# Patient Record
Sex: FEMALE | NOT HISPANIC OR LATINO | Employment: UNEMPLOYED | URBAN - METROPOLITAN AREA
[De-identification: names, ages, dates, MRNs, and addresses within clinical notes are randomized per-mention and may not be internally consistent; named-entity substitution may affect disease eponyms.]

---

## 2017-04-14 RX ORDER — LEVOTHYROXINE SODIUM 0.03 MG/1
TABLET ORAL
Qty: 30 TAB | Refills: 5 | Status: SHIPPED | OUTPATIENT
Start: 2017-04-14 | End: 2017-04-26 | Stop reason: SDUPTHER

## 2017-04-26 RX ORDER — LEVOTHYROXINE SODIUM 0.03 MG/1
25 TABLET ORAL
Qty: 90 TAB | Refills: 1 | Status: SHIPPED | OUTPATIENT
Start: 2017-04-26 | End: 2017-10-13

## 2017-07-06 ENCOUNTER — HOSPITAL ENCOUNTER (OUTPATIENT)
Dept: RADIOLOGY | Facility: MEDICAL CENTER | Age: 47
End: 2017-07-06
Attending: SURGERY
Payer: COMMERCIAL

## 2017-07-06 DIAGNOSIS — E06.3 CHRONIC LYMPHOCYTIC THYROIDITIS: ICD-10-CM

## 2017-07-06 PROCEDURE — 76536 US EXAM OF HEAD AND NECK: CPT

## 2017-08-10 ENCOUNTER — TELEPHONE (OUTPATIENT)
Dept: MEDICAL GROUP | Facility: MEDICAL CENTER | Age: 47
End: 2017-08-10

## 2017-08-10 DIAGNOSIS — R79.89 ELEVATED TSH: ICD-10-CM

## 2017-08-10 DIAGNOSIS — E04.1 THYROID NODULE: ICD-10-CM

## 2017-08-10 NOTE — TELEPHONE ENCOUNTER
Pt called states she needs a referral to [facility] for her thyroid   She had a referral and already has apt but they told her it was .

## 2017-08-14 ENCOUNTER — OFFICE VISIT (OUTPATIENT)
Dept: MEDICAL GROUP | Facility: MEDICAL CENTER | Age: 47
End: 2017-08-14
Payer: COMMERCIAL

## 2017-08-14 VITALS
HEIGHT: 62 IN | RESPIRATION RATE: 16 BRPM | DIASTOLIC BLOOD PRESSURE: 62 MMHG | SYSTOLIC BLOOD PRESSURE: 106 MMHG | BODY MASS INDEX: 32.57 KG/M2 | OXYGEN SATURATION: 98 % | HEART RATE: 70 BPM | TEMPERATURE: 98 F | WEIGHT: 177 LBS

## 2017-08-14 DIAGNOSIS — H10.13 ALLERGIC CONJUNCTIVITIS OF BOTH EYES: ICD-10-CM

## 2017-08-14 DIAGNOSIS — E03.9 HYPOTHYROIDISM (ACQUIRED): ICD-10-CM

## 2017-08-14 DIAGNOSIS — E66.9 OBESITY (BMI 30-39.9): ICD-10-CM

## 2017-08-14 DIAGNOSIS — Z12.31 ENCOUNTER FOR SCREENING MAMMOGRAM FOR BREAST CANCER: ICD-10-CM

## 2017-08-14 PROCEDURE — 99214 OFFICE O/P EST MOD 30 MIN: CPT | Performed by: NURSE PRACTITIONER

## 2017-08-14 RX ORDER — AZELASTINE HYDROCHLORIDE 0.5 MG/ML
1 SOLUTION/ DROPS OPHTHALMIC 2 TIMES DAILY
Qty: 6 ML | Refills: 2 | Status: SHIPPED | OUTPATIENT
Start: 2017-08-14 | End: 2019-05-06

## 2017-08-14 ASSESSMENT — PATIENT HEALTH QUESTIONNAIRE - PHQ9: CLINICAL INTERPRETATION OF PHQ2 SCORE: 0

## 2017-08-14 NOTE — MR AVS SNAPSHOT
"        Barb Clemensrez   2017 9:40 AM   Office Visit   MRN: [id number]    Department:  [facility]   Dept Phone:  [phone]    Description:  Female : 1970   Provider:  AILYN Bradshaw           Reason for Visit     Follow-Up           Allergies as of 2017     Allergen Noted Reactions    Pcn [Penicillins] 2016   Shortness of Breath, Rash    .      You were diagnosed with     Hypothyroidism (acquired)   [705452]       Allergic conjunctivitis of both eyes   [495248]       Encounter for screening mammogram for breast cancer   [0193376]       Obesity (BMI 30-39.9)   [295796]         Vital Signs     Blood Pressure Pulse Temperature Respirations Height Weight    106/62 mmHg 70 36.7 °C (98 °F) 16 1.575 m (5' 2.01\") 80.287 kg (177 lb)    Body Mass Index Oxygen Saturation Smoking Status             32.37 kg/m2 98% Never Smoker          Basic Information     Date Of Birth Sex Race Ethnicity Preferred Language    1970 Female  or  Non- English      Problem List              ICD-10-CM Priority Class Noted - Resolved    Healthy adult [id number]   2016 - Present    Thyroid nodule E04.1   2016 - Present    Carpal tunnel syndrome G56.00   10/4/2016 - Present    Allergic conjunctivitis of both eyes H10.13   2017 - Present    Hypothyroidism (acquired) E03.9   2017 - Present    Obesity (BMI 30-39.9) E66.9   2017 - Present      Health Maintenance        Date Due Completion Dates    IMM DTaP/Tdap/Td Vaccine (1 - Tdap) 1991 ---    MAMMOGRAM 2017    IMM INFLUENZA (1) 2017 ---    PAP SMEAR 2019, 2016            Current Immunizations     No immunizations on file.      Below and/or attached are the medications your provider expects you to take. Review all of your home medications and newly ordered medications with your provider and/or pharmacist. Follow medication instructions as directed by your provider and/or " pharmacist. Please keep your medication list with you and share with your provider. Update the information when medications are discontinued, doses are changed, or new medications (including over-the-counter products) are added; and carry medication information at all times in the event of emergency situations     Allergies:  PCN - Shortness of Breath,Rash               Medications  Valid as of: August 14, 2017 - 11:29 AM    Generic Name Brand Name Tablet Size Instructions for use    Azelastine HCl (Solution) OPTIVAR 0.05 % Place 1 Drop in both eyes 2 times a day.        Levothyroxine Sodium (Tab) SYNTHROID 25 MCG Take 1 Tab by mouth every morning before breakfast.        .                 Medicines prescribed today were sent to:     [id number] - [place], [place] - [address]    [address] [address]    Phone: [phone] Fax: [phone]    Open 24 Hours?: No      Medication refill instructions:       If your prescription bottle indicates you have medication refills left, it is not necessary to call your provider’s office. Please contact your pharmacy and they will refill your medication.    If your prescription bottle indicates you do not have any refills left, you may request refills at any time through one of the following ways: The online [employer] system (except Urgent Care), by calling your provider’s office, or by asking your pharmacy to contact your provider’s office with a refill request. Medication refills are processed only during regular business hours and may not be available until the next business day. Your provider may request additional information or to have a follow-up visit with you prior to refilling your medication.   *Please Note: Medication refills are assigned a new Rx number when refilled electronically. Your pharmacy may indicate that no refills were authorized even though a new prescription for the same medication is available at the pharmacy. Please  request the medicine by name with the pharmacy before contacting your provider for a refill.        Your To Do List     Future Labs/Procedures Complete By Expires    COMP METABOLIC PANEL  As directed 8/15/2018    FREE THYROXINE  As directed 8/15/2018    MA-SCREEN MAMMO W/CAD-BILAT  As directed 9/15/2018    TSH  As directed 8/15/2018         MyChart Status: Patient Declined

## 2017-08-14 NOTE — ASSESSMENT & PLAN NOTE
On levothyroxine 25 µg daily  Still having difficulty with low energy level and weight gain  Due for recheck of labs  Denies constipation, hair loss, heat or cold intolerance

## 2017-08-14 NOTE — PROGRESS NOTES
"Subjective:     Chief Complaint   Patient presents with   • Follow-Up     Barb Carlisle is a 47 y.o. female here today to follow up on:    Allergic conjunctivitis of both eyes  Difficulty with itchy watery eyes over the past 2 months, sometimes with redness or irritation and excessive tearing. She's tried multiple over-the-counter allergy drops without relief and is requesting prescription. No purulent discharge, no pain    Hypothyroidism (acquired)  On levothyroxine 25 µg daily  Still having difficulty with low energy level and weight gain  Due for recheck of labs  Denies constipation, hair loss, heat or cold intolerance    Obesity (BMI 30-39.9)  Gradual weight gain since last visit, not having any particular exercise. She is trying to follow a healthy diet     Due for mammogram  Current medicines (including changes today)  Current Outpatient Prescriptions   Medication Sig Dispense Refill   • azelastine (OPTIVAR) 0.05 % ophthalmic solution Place 1 Drop in both eyes 2 times a day. 6 mL 2   • levothyroxine (SYNTHROID) 25 MCG Tab Take 1 Tab by mouth every morning before breakfast. 90 Tab 1     No current facility-administered medications for this visit.     She  has a past medical history of Disorder of thyroid.    ROS included above     Objective:     Blood pressure 106/62, pulse 70, temperature 36.7 °C (98 °F), resp. rate 16, height 1.575 m (5' 2.01\"), weight 80.287 kg (177 lb), SpO2 98 %. Body mass index is 32.37 kg/(m^2).     Physical Exam:  General: Alert, oriented in no acute distress.  Eye contact is good, speech is normal, affect calm  HEENT: Conjunctiva and sclera clear, no drainage Oral mucosa pink moist, no lesions. TMs gray with good landmarks bilaterally. No lymphadenopathy.  Lungs: clear to auscultation bilaterally, normal effort, no wheeze/ rhonchi/ rales.  CV: regular rate and rhythm, S1, S2  Abdomen: soft, nontender  Ext: no edema, color normal, vascularity normal, temperature normal    Assessment " and Plan:   The following treatment plan was discussed  1. Hypothyroidism (acquired)   poor energy level and weight gain over the last year. Recheck labs, will adjust levothyroxine as needed  TSH    FREE THYROXINE    COMP METABOLIC PANEL   2. Allergic conjunctivitis of both eyes   she's tried multiple over-the-counter allergy drops without relief. Azelastine sent, follow-up if not improving    3. Encounter for screening mammogram for breast cancer  MA-SCREEN MAMMO W/CAD-BILAT   4. Obesity (BMI 30-39.9)  Patient identified as having weight management issue.  Appropriate orders and counseling given.     Followup: Pending labs         Please note that this dictation was created using voice recognition software. I have worked with consultants from the vendor as well as technical experts from [employer] to optimize the interface. I have made every reasonable attempt to correct obvious errors, but I expect that there are errors of grammar and possibly content that I did not discover before finalizing the note.

## 2017-08-14 NOTE — ASSESSMENT & PLAN NOTE
Gradual weight gain since last visit, not having any particular exercise. She is trying to follow a healthy diet

## 2017-08-14 NOTE — ASSESSMENT & PLAN NOTE
Difficulty with itchy watery eyes over the past 2 months, sometimes with redness or irritation and excessive tearing. She's tried multiple over-the-counter allergy drops without relief and is requesting prescription. No purulent discharge, no pain

## 2017-08-23 ENCOUNTER — HOSPITAL ENCOUNTER (OUTPATIENT)
Dept: RADIOLOGY | Facility: MEDICAL CENTER | Age: 47
End: 2017-08-23

## 2017-08-26 ENCOUNTER — HOSPITAL ENCOUNTER (OUTPATIENT)
Dept: RADIOLOGY | Facility: MEDICAL CENTER | Age: 47
End: 2017-08-26
Attending: NURSE PRACTITIONER
Payer: COMMERCIAL

## 2017-08-26 DIAGNOSIS — Z12.31 ENCOUNTER FOR SCREENING MAMMOGRAM FOR BREAST CANCER: ICD-10-CM

## 2017-08-26 PROCEDURE — G0202 SCR MAMMO BI INCL CAD: HCPCS

## 2017-08-28 ENCOUNTER — TELEPHONE (OUTPATIENT)
Dept: MEDICAL GROUP | Facility: MEDICAL CENTER | Age: 47
End: 2017-08-28

## 2017-08-28 NOTE — TELEPHONE ENCOUNTER
1. Caller Name:Barb Carlisle                          Call Back Number: .    2. Message: Patient called states at her last ov visit she stated she has back pain and you were going to give her back exercises, but states she never got them. Would like to know if we can mail them to her,    3. Patient approves office to leave a detailed voicemail/MyChart message: yes

## 2017-08-29 ENCOUNTER — HOSPITAL ENCOUNTER (OUTPATIENT)
Dept: LAB | Facility: MEDICAL CENTER | Age: 47
End: 2017-08-29
Attending: NURSE PRACTITIONER
Payer: COMMERCIAL

## 2017-08-29 DIAGNOSIS — E03.9 HYPOTHYROIDISM (ACQUIRED): ICD-10-CM

## 2017-08-29 LAB
ALBUMIN SERPL BCP-MCNC: 4.1 G/DL (ref 3.2–4.9)
ALBUMIN/GLOB SERPL: 1.4 G/DL
ALP SERPL-CCNC: 51 U/L (ref 30–99)
ALT SERPL-CCNC: 21 U/L (ref 2–50)
ANION GAP SERPL CALC-SCNC: 7 MMOL/L (ref 0–11.9)
AST SERPL-CCNC: 18 U/L (ref 12–45)
BILIRUB SERPL-MCNC: 0.3 MG/DL (ref 0.1–1.5)
BUN SERPL-MCNC: 10 MG/DL (ref 8–22)
CALCIUM SERPL-MCNC: 9.6 MG/DL (ref 8.5–10.5)
CHLORIDE SERPL-SCNC: 106 MMOL/L (ref 96–112)
CO2 SERPL-SCNC: 23 MMOL/L (ref 20–33)
CREAT SERPL-MCNC: 0.73 MG/DL (ref 0.5–1.4)
FASTING STATUS PATIENT QL REPORTED: NORMAL
GFR SERPL CREATININE-BSD FRML MDRD: >60 ML/MIN/1.73 M 2
GLOBULIN SER CALC-MCNC: 2.9 G/DL (ref 1.9–3.5)
GLUCOSE SERPL-MCNC: 90 MG/DL (ref 65–99)
POTASSIUM SERPL-SCNC: 3.8 MMOL/L (ref 3.6–5.5)
PROT SERPL-MCNC: 7 G/DL (ref 6–8.2)
SODIUM SERPL-SCNC: 136 MMOL/L (ref 135–145)
T4 FREE SERPL-MCNC: 0.76 NG/DL (ref 0.53–1.43)
TSH SERPL DL<=0.005 MIU/L-ACNC: 5.81 UIU/ML (ref 0.3–3.7)

## 2017-08-29 PROCEDURE — 36415 COLL VENOUS BLD VENIPUNCTURE: CPT

## 2017-08-29 PROCEDURE — 80053 COMPREHEN METABOLIC PANEL: CPT

## 2017-08-29 PROCEDURE — 84439 ASSAY OF FREE THYROXINE: CPT

## 2017-08-29 PROCEDURE — 84443 ASSAY THYROID STIM HORMONE: CPT

## 2017-08-30 ENCOUNTER — TELEPHONE (OUTPATIENT)
Dept: MEDICAL GROUP | Facility: MEDICAL CENTER | Age: 47
End: 2017-08-30

## 2017-08-30 DIAGNOSIS — E03.9 HYPOTHYROIDISM (ACQUIRED): ICD-10-CM

## 2017-08-30 RX ORDER — LEVOTHYROXINE SODIUM 0.05 MG/1
50 TABLET ORAL
Qty: 30 TAB | Refills: 5 | Status: SHIPPED | OUTPATIENT
Start: 2017-08-30 | End: 2018-03-16 | Stop reason: SDUPTHER

## 2017-08-30 NOTE — TELEPHONE ENCOUNTER
----- Message from AILYN Bradshaw sent at 8/30/2017 10:36 AM PDT -----  Please inform patient:  Kidney function, liver function, electrolytes, glucose are normal. It does likely need to adjust her thyroid dose a little bit, I will send a higher dose to pharmacy. She should recheck labs in November, please mail paperwork

## 2017-10-13 ENCOUNTER — OFFICE VISIT (OUTPATIENT)
Dept: ENDOCRINOLOGY | Facility: MEDICAL CENTER | Age: 47
End: 2017-10-13
Payer: COMMERCIAL

## 2017-10-13 VITALS
WEIGHT: 176.8 LBS | OXYGEN SATURATION: 100 % | HEART RATE: 71 BPM | HEIGHT: 63 IN | DIASTOLIC BLOOD PRESSURE: 72 MMHG | SYSTOLIC BLOOD PRESSURE: 112 MMHG | BODY MASS INDEX: 31.33 KG/M2

## 2017-10-13 DIAGNOSIS — E03.9 ACQUIRED HYPOTHYROIDISM: ICD-10-CM

## 2017-10-13 DIAGNOSIS — E04.2 MULTIPLE THYROID NODULES: ICD-10-CM

## 2017-10-13 PROCEDURE — 99204 OFFICE O/P NEW MOD 45 MIN: CPT | Performed by: INTERNAL MEDICINE

## 2017-10-13 NOTE — PROGRESS NOTES
New Patient Consult Note  Primary care physician: AILYN Bradshaw    Reason for consult: Multiple thyroid nodules    HPI:  Barb Carlisle is a 47 y.o. old patient who comes in today for evaluation of thyroid nodules. Thyroid ultrasound in July 2017 showed stable appearance of dominant of thyroid nodule, and slightly decreased size of the smaller left thyroid lobe nodule, compared to prior ultrasound in 2016. No family history of thyroid cancer. FNA of dominant left thyroid lobe nodule in December 2016 was benign. She has hypothyroidism as well, she is currently on levothyroxine 50 µg daily. Dose was increased about 4 weeks ago as TSH was slightly elevated. She reports compliance with medications. Energy levels are good.    ROS:  Constitutional: No unintentional weight loss  Cardiac: No palpitations or racing heart  Resp: No shortness of breath    All other systems were reviewed and were negative.    Past Medical History:  Patient Active Problem List    Diagnosis Date Noted   • Allergic conjunctivitis of both eyes 08/14/2017   • Hypothyroidism (acquired) 08/14/2017   • Obesity (BMI 30-39.9) 08/14/2017   • Carpal tunnel syndrome 10/04/2016   • Thyroid nodule 09/21/2016   • Healthy adult 07/02/2016       Past Surgical History:  Past Surgical History:   Procedure Laterality Date   • CARPAL TUNNEL ENDOSCOPIC Right 10/4/2016    Procedure: CARPAL TUNNEL ENDOSCOPIC RELEASE;  Surgeon: Raheem Gross M.D.;  Location: SURGERY [facility];  Service:    • GM BY LAPAROSCOPY     • KNEE ARTHROSCOPY Right    • PB APPENDECTOMY     • SHOULDER ARTHROSCOPY         Allergies:  Pcn [penicillins]    Social History:  Social History     Social History   • Marital status:      Spouse name: N/A   • Number of children: N/A   • Years of education: N/A     Occupational History   • Not on file.     Social History Main Topics   • Smoking status: Never Smoker   • Smokeless tobacco: Never Used   • Alcohol use No   • Drug  "use: No   • Sexual activity: Not on file     Other Topics Concern   • Not on file     Social History Narrative   • No narrative on file       Family History:  Family History   Problem Relation Age of Onset   • Diabetes Mother    • Diabetes Father    • Diabetes Brother    • Stroke Brother    • Cancer Maternal Aunt      breast   • Cancer Maternal Grandmother      throat   • Cancer Maternal Grandfather      prostate       Medications:    Current Outpatient Prescriptions:   •  Cholecalciferol (VITAMIN D3 PO), Take 4,000 Units by mouth., Disp: , Rfl:   •  levothyroxine (SYNTHROID) 50 MCG Tab, Take 1 Tab by mouth Every morning on an empty stomach., Disp: 30 Tab, Rfl: 5  •  azelastine (OPTIVAR) 0.05 % ophthalmic solution, Place 1 Drop in both eyes 2 times a day., Disp: 6 mL, Rfl: 2    Labs:  Results for JAZZMINE DAVENPORT (MRN [id number]) as of 10/13/2017 09:44   Ref. Range 8/29/2017 08:29   TSH Latest Ref Range: 0.300 - 3.700 uIU/mL 5.810 (H)   Free T-4 Latest Ref Range: 0.53 - 1.43 ng/dL 0.76     Physical Examination:  Vital signs: /72   Pulse 71   Ht 1.6 m (5' 3\")   Wt 80.2 kg (176 lb 12.8 oz)   LMP 10/13/2017   SpO2 100%   BMI 31.32 kg/m²   General: No apparent distress, cooperative  Eyes: No scleral icterus or discharge  ENMT: Normal on external inspection of nose, lips  Neck: No abnormal masses on inspection  Resp: Normal effort, clear to auscultation bilaterally   CVS: Regular rate and rhythm, S1 S2 normal, no murmur   Extremities: No edema  Neuro: Alert and oriented  Skin: No rash  Psych: Normal mood and affect    Assessment and Plan:    1. Multiple thyroid nodules  · We will repeat thyroid ultrasound in July 2018  - US-SOFT TISSUES OF HEAD - NECK; Future    2. Acquired hypothyroidism  · Continue levothyroxine 50 µg daily  · Repeat labs for TSH and free T4 are in a month and then again in 6 months  - TSH; Future  - FREE THYROXINE; Future    Return around 7/20/2018.    Thank you for allowing me to participate " in the care of this patient.    Ninoska Rooney M.D.  10/13/17    CC:   YUE Bradshaw.    This note was created using voice recognition software ([employer]agon). The accuracy of the dictation is limited by the abilities of the software. I have reviewed the note prior to signing, however some errors in grammar and context are still possible. If you have any questions related to this note please do not hesitate to contact our office.

## 2018-03-18 DIAGNOSIS — E03.9 HYPOTHYROIDISM (ACQUIRED): ICD-10-CM

## 2018-03-19 RX ORDER — LEVOTHYROXINE SODIUM 0.05 MG/1
50 TABLET ORAL
Qty: 30 TAB | Refills: 5 | Status: SHIPPED | OUTPATIENT
Start: 2018-03-19 | End: 2019-03-25 | Stop reason: SDUPTHER

## 2018-03-19 NOTE — TELEPHONE ENCOUNTER
Phone Number Called: [phone] (home)     Message: Left message for pt to call back at [phone].     Left Message for patient to call back: yes

## 2018-04-20 ENCOUNTER — TELEPHONE (OUTPATIENT)
Dept: MEDICAL GROUP | Facility: MEDICAL CENTER | Age: 48
End: 2018-04-20

## 2018-04-20 NOTE — TELEPHONE ENCOUNTER
1. Caller Name: Barb Carlisle                                         Call Back Number: [phone] (home)       Patient approves a detailed voicemail message: yes    Pt states Dr. Rooney referred for US in July. Pt would like to know if you feel this should be done sooner.

## 2018-05-05 ENCOUNTER — HOSPITAL ENCOUNTER (OUTPATIENT)
Dept: LAB | Facility: MEDICAL CENTER | Age: 48
End: 2018-05-05
Attending: STUDENT IN AN ORGANIZED HEALTH CARE EDUCATION/TRAINING PROGRAM
Payer: COMMERCIAL

## 2018-05-05 LAB
ALBUMIN SERPL BCP-MCNC: 4.1 G/DL (ref 3.2–4.9)
ALBUMIN/GLOB SERPL: 1.3 G/DL
ALP SERPL-CCNC: 49 U/L (ref 30–99)
ALT SERPL-CCNC: 24 U/L (ref 2–50)
ANION GAP SERPL CALC-SCNC: 7 MMOL/L (ref 0–11.9)
AST SERPL-CCNC: 18 U/L (ref 12–45)
BASOPHILS # BLD AUTO: 0.8 % (ref 0–1.8)
BASOPHILS # BLD: 0.06 K/UL (ref 0–0.12)
BILIRUB SERPL-MCNC: 0.6 MG/DL (ref 0.1–1.5)
BUN SERPL-MCNC: 20 MG/DL (ref 8–22)
CALCIUM SERPL-MCNC: 9.1 MG/DL (ref 8.5–10.5)
CHLORIDE SERPL-SCNC: 106 MMOL/L (ref 96–112)
CHOLEST SERPL-MCNC: 166 MG/DL (ref 100–199)
CO2 SERPL-SCNC: 23 MMOL/L (ref 20–33)
CREAT SERPL-MCNC: 0.74 MG/DL (ref 0.5–1.4)
EOSINOPHIL # BLD AUTO: 0.19 K/UL (ref 0–0.51)
EOSINOPHIL NFR BLD: 2.4 % (ref 0–6.9)
ERYTHROCYTE [DISTWIDTH] IN BLOOD BY AUTOMATED COUNT: 40.7 FL (ref 35.9–50)
GLOBULIN SER CALC-MCNC: 3.2 G/DL (ref 1.9–3.5)
GLUCOSE SERPL-MCNC: 95 MG/DL (ref 65–99)
HCT VFR BLD AUTO: 43.4 % (ref 37–47)
HDLC SERPL-MCNC: 49 MG/DL
HGB BLD-MCNC: 14.4 G/DL (ref 12–16)
IMM GRANULOCYTES # BLD AUTO: 0.03 K/UL (ref 0–0.11)
IMM GRANULOCYTES NFR BLD AUTO: 0.4 % (ref 0–0.9)
LDLC SERPL CALC-MCNC: 87 MG/DL
LYMPHOCYTES # BLD AUTO: 2.08 K/UL (ref 1–4.8)
LYMPHOCYTES NFR BLD: 26.5 % (ref 22–41)
MCH RBC QN AUTO: 29.5 PG (ref 27–33)
MCHC RBC AUTO-ENTMCNC: 33.2 G/DL (ref 33.6–35)
MCV RBC AUTO: 88.9 FL (ref 81.4–97.8)
MONOCYTES # BLD AUTO: 0.55 K/UL (ref 0–0.85)
MONOCYTES NFR BLD AUTO: 7 % (ref 0–13.4)
NEUTROPHILS # BLD AUTO: 4.94 K/UL (ref 2–7.15)
NEUTROPHILS NFR BLD: 62.9 % (ref 44–72)
NRBC # BLD AUTO: 0 K/UL
NRBC BLD-RTO: 0 /100 WBC
PLATELET # BLD AUTO: 270 K/UL (ref 164–446)
PMV BLD AUTO: 11.1 FL (ref 9–12.9)
POTASSIUM SERPL-SCNC: 3.8 MMOL/L (ref 3.6–5.5)
PROT SERPL-MCNC: 7.3 G/DL (ref 6–8.2)
RBC # BLD AUTO: 4.88 M/UL (ref 4.2–5.4)
SODIUM SERPL-SCNC: 136 MMOL/L (ref 135–145)
T3FREE SERPL-MCNC: 3.26 PG/ML (ref 2.4–4.2)
T4 FREE SERPL-MCNC: 0.75 NG/DL (ref 0.53–1.43)
TRIGL SERPL-MCNC: 152 MG/DL (ref 0–149)
TSH SERPL DL<=0.005 MIU/L-ACNC: 3.92 UIU/ML (ref 0.38–5.33)
WBC # BLD AUTO: 7.9 K/UL (ref 4.8–10.8)

## 2018-05-05 PROCEDURE — 83036 HEMOGLOBIN GLYCOSYLATED A1C: CPT

## 2018-05-05 PROCEDURE — 84481 FREE ASSAY (FT-3): CPT

## 2018-05-05 PROCEDURE — 36415 COLL VENOUS BLD VENIPUNCTURE: CPT

## 2018-05-05 PROCEDURE — 80053 COMPREHEN METABOLIC PANEL: CPT

## 2018-05-05 PROCEDURE — 84439 ASSAY OF FREE THYROXINE: CPT

## 2018-05-05 PROCEDURE — 84443 ASSAY THYROID STIM HORMONE: CPT

## 2018-05-05 PROCEDURE — 80061 LIPID PANEL: CPT

## 2018-05-05 PROCEDURE — 85025 COMPLETE CBC W/AUTO DIFF WBC: CPT

## 2018-05-05 PROCEDURE — 82652 VIT D 1 25-DIHYDROXY: CPT

## 2018-05-07 LAB — 1,25(OH)2D3 SERPL-MCNC: 43.9 PG/ML (ref 19.9–79.3)

## 2018-05-09 LAB
EST. AVERAGE GLUCOSE BLD GHB EST-MCNC: 117 MG/DL
HBA1C MFR BLD: 5.7 % (ref 0–5.6)

## 2018-07-27 ENCOUNTER — OFFICE VISIT (OUTPATIENT)
Dept: ENDOCRINOLOGY | Facility: MEDICAL CENTER | Age: 48
End: 2018-07-27
Payer: COMMERCIAL

## 2018-07-27 VITALS
BODY MASS INDEX: 30.83 KG/M2 | SYSTOLIC BLOOD PRESSURE: 120 MMHG | DIASTOLIC BLOOD PRESSURE: 69 MMHG | OXYGEN SATURATION: 96 % | HEART RATE: 81 BPM | HEIGHT: 63 IN | WEIGHT: 174 LBS

## 2018-07-27 DIAGNOSIS — E03.9 ACQUIRED HYPOTHYROIDISM: ICD-10-CM

## 2018-07-27 DIAGNOSIS — Z91.89 AT RISK FOR DIABETES MELLITUS: ICD-10-CM

## 2018-07-27 DIAGNOSIS — E04.2 MULTIPLE THYROID NODULES: ICD-10-CM

## 2018-07-27 PROCEDURE — 99214 OFFICE O/P EST MOD 30 MIN: CPT | Performed by: INTERNAL MEDICINE

## 2018-07-28 ENCOUNTER — HOSPITAL ENCOUNTER (OUTPATIENT)
Dept: RADIOLOGY | Facility: MEDICAL CENTER | Age: 48
End: 2018-07-28
Attending: INTERNAL MEDICINE
Payer: COMMERCIAL

## 2018-07-28 DIAGNOSIS — E04.2 MULTIPLE THYROID NODULES: ICD-10-CM

## 2018-07-28 PROCEDURE — 76536 US EXAM OF HEAD AND NECK: CPT

## 2018-08-01 ENCOUNTER — TELEPHONE (OUTPATIENT)
Dept: ENDOCRINOLOGY | Facility: MEDICAL CENTER | Age: 48
End: 2018-08-01

## 2018-08-01 NOTE — TELEPHONE ENCOUNTER
----- Message from Ninoska Rooney M.D. sent at 7/31/2018  8:22 AM PDT -----  Please inform patient that thyroid nodules are essentially stable in size.

## 2019-02-06 ENCOUNTER — TELEPHONE (OUTPATIENT)
Dept: ENDOCRINOLOGY | Facility: MEDICAL CENTER | Age: 49
End: 2019-02-06

## 2019-02-06 ENCOUNTER — OFFICE VISIT (OUTPATIENT)
Dept: ENDOCRINOLOGY | Facility: MEDICAL CENTER | Age: 49
End: 2019-02-06

## 2019-02-06 VITALS
HEART RATE: 94 BPM | OXYGEN SATURATION: 96 % | SYSTOLIC BLOOD PRESSURE: 102 MMHG | HEIGHT: 63 IN | WEIGHT: 178 LBS | DIASTOLIC BLOOD PRESSURE: 72 MMHG | BODY MASS INDEX: 31.54 KG/M2

## 2019-02-06 DIAGNOSIS — E66.9 OBESITY (BMI 30-39.9): ICD-10-CM

## 2019-02-06 DIAGNOSIS — R53.83 FATIGUE, UNSPECIFIED TYPE: ICD-10-CM

## 2019-02-06 DIAGNOSIS — R73.01 IMPAIRED FASTING GLUCOSE: ICD-10-CM

## 2019-02-06 DIAGNOSIS — E04.1 THYROID NODULE: ICD-10-CM

## 2019-02-06 DIAGNOSIS — E03.9 HYPOTHYROIDISM (ACQUIRED): ICD-10-CM

## 2019-02-06 DIAGNOSIS — E55.9 VITAMIN D DEFICIENCY: ICD-10-CM

## 2019-02-06 PROCEDURE — 99214 OFFICE O/P EST MOD 30 MIN: CPT | Performed by: PHYSICIAN ASSISTANT

## 2019-02-06 NOTE — TELEPHONE ENCOUNTER
Phone Number Called: [phone] (home)     Message: Let Pt know that an US has been ordered and they should be calling to get her scheduled. Pt understood and will make the appointment to get it done.     Left Message for patient to call back: N\A

## 2019-02-06 NOTE — TELEPHONE ENCOUNTER
----- Message from Dorys Sears P.A.-C. sent at 2/6/2019  9:31 AM PST -----  Please call patient and tell her I added a thyroid ultrasound.  They should be calling her to schedule this in the near future.

## 2019-02-06 NOTE — PROGRESS NOTES
Endocrinology Clinic Progress Note    CC:     HPI:  Barb Carlisle is a 48 y.o. old patient who comes in today with her daughter and grandson for routine follow up.       According to the patient she does have some complaints of fatigue, dry mouth, increased urination and increased thirst.  This is been going on for the last several months.  She attributes the increase in thirst related to a surgery she had with regards to her arm.  She has not had any recent laboratory work.    1. Multiple thyroid nodules   Denies any associated difficulty swallowing or breathing.    Thyroid ultrasound: 7/28/2018  Solid nodule mid lateral left lobe thyroid gland measures 1.14 x 1.14 x 1.24 cm. Prior exam was 1.0 x 1.1 x 1.1 cm. Nodule in lower pole left lobe measures 0.6 x 0.6 x 0.6 cm.      7/6/2017  There is a nodule in the mid left thyroid lobe which measures 1.0 x 1.1 x 1.1 cm. It has not significantly changed. There is a nodule in the lower pole of the left thyroid lobe which measures 0.7 x 0.6 x 0.6 cm. Is slightly smaller than it was on previous exam.    FNA left thyroid: 12/22/2016 follicular epithelial cells consistent with follicular lesion no papillary carcinoma was seen       2. Acquired hypothyroidism  She is currently on levothyroxine 50 mcg daily.  Reports compliance of medications.  Patient occasionally misses doses secondary to pharmacy not filling medications right away.       5/5/2018 TSH 3.920 free T4 0.75 free T3 3.26  8/29/2017 TSH 5.810 free T4 0.76  3. At risk for diabetes mellitus  5/5/2018- A1c is 5.7%.,  Total cholesterol 162, triglycerides 152, HDL 49, LDL 87     Patient denies any diet and/or exercise regimen    4. Vitamin D deficiency -patient is currently on vitamin D replacement  5/5/2018 vitamin D/1, 2543.9    5.  Obesity weight 80.7 kg (178 pounds)    ROS:  Constitutional: No fevers chills lightheadedness and/or dizziness.  She denies any tremors and/or palpitations.  She denies any choking and/or  "difficulty swallowing.    Medications:    Current Outpatient Prescriptions:   •  levothyroxine, 50 mcg, Oral, AM ES, Taking  •  Cholecalciferol (VITAMIN D3 PO), Take 4,000 Units by mouth., Taking  •  levothyroxine, 50 mcg, Oral, AM ES  •  azelastine, 1 Drop, Both Eyes, BID    EXAM:  Vital signs: /72 (BP Location: Right arm, Patient Position: Sitting)   Pulse 94   Ht 1.6 m (5' 3\")   Wt 80.7 kg (178 lb)   SpO2 96%   BMI 31.53 kg/m²   General: No apparent distress, cooperative  Eyes: No scleral icterus, no discharge  Resp: Normal effort  Extremities: No lower extremity edema  Psych: Alert and oriented, normal mood and affect    Assessment and Plan:  Multiple thyroid nodules:  After review of her last ultrasound from 7/28/2018 there was some growth located on the ultrasound.  It was very minimal but I would like to repeat ultrasound today.  Will put that in as an order.    2. Acquired hypothyroidism  She is currently on levothyroxine 50 mcg daily.    She does have some symptoms.  She has not had any recent work.  Will repeat labs and adjust dose based upon labs and symptomatology.    3. At risk for diabetes mellitus/impaired glucose/obesity-  Patient is symptomatic with polydipsia and polyuria.  I will recheck labs for her next evaluation.    I have encouraged diet and lifestyle changes.    4. Vitamin D deficiency -patient is currently on vitamin D replacement  5/5/2018 vitamin D/1, 2543.9        Return in about 3 months (around 5/6/2019).    Thank you for allowing me to participate in the care of this patient.     Dorys Sears P.A.-C.    CC:   YUE Bradshaw.    This note was created using voice recognition software ([employer]). The accuracy of the dictation is limited by the abilities of the software. I have reviewed the note prior to signing, however some errors in grammar and context are still possible. If you have any questions related to this note please do not hesitate to contact our " office.

## 2019-02-12 ENCOUNTER — HOSPITAL ENCOUNTER (OUTPATIENT)
Dept: LAB | Facility: MEDICAL CENTER | Age: 49
End: 2019-02-12
Attending: PHYSICIAN ASSISTANT

## 2019-02-12 ENCOUNTER — HOSPITAL ENCOUNTER (OUTPATIENT)
Dept: RADIOLOGY | Facility: MEDICAL CENTER | Age: 49
End: 2019-02-12
Attending: PHYSICIAN ASSISTANT

## 2019-02-12 DIAGNOSIS — E55.9 VITAMIN D DEFICIENCY: ICD-10-CM

## 2019-02-12 DIAGNOSIS — E04.1 THYROID NODULE: ICD-10-CM

## 2019-02-12 DIAGNOSIS — E66.9 OBESITY (BMI 30-39.9): ICD-10-CM

## 2019-02-12 DIAGNOSIS — R73.01 IMPAIRED FASTING GLUCOSE: ICD-10-CM

## 2019-02-12 DIAGNOSIS — R53.83 FATIGUE, UNSPECIFIED TYPE: ICD-10-CM

## 2019-02-12 DIAGNOSIS — E03.9 HYPOTHYROIDISM (ACQUIRED): ICD-10-CM

## 2019-02-12 LAB
25(OH)D3 SERPL-MCNC: 24 NG/ML (ref 30–100)
ALBUMIN SERPL BCP-MCNC: 4.3 G/DL (ref 3.2–4.9)
ALBUMIN/GLOB SERPL: 1.7 G/DL
ALP SERPL-CCNC: 51 U/L (ref 30–99)
ALT SERPL-CCNC: 27 U/L (ref 2–50)
ANION GAP SERPL CALC-SCNC: 8 MMOL/L (ref 0–11.9)
AST SERPL-CCNC: 20 U/L (ref 12–45)
BILIRUB SERPL-MCNC: 0.4 MG/DL (ref 0.1–1.5)
BUN SERPL-MCNC: 14 MG/DL (ref 8–22)
CALCIUM SERPL-MCNC: 10.1 MG/DL (ref 8.5–10.5)
CHLORIDE SERPL-SCNC: 106 MMOL/L (ref 96–112)
CO2 SERPL-SCNC: 25 MMOL/L (ref 20–33)
CREAT SERPL-MCNC: 0.84 MG/DL (ref 0.5–1.4)
EST. AVERAGE GLUCOSE BLD GHB EST-MCNC: 117 MG/DL
FASTING STATUS PATIENT QL REPORTED: NORMAL
GLOBULIN SER CALC-MCNC: 2.6 G/DL (ref 1.9–3.5)
GLUCOSE SERPL-MCNC: 102 MG/DL (ref 65–99)
HBA1C MFR BLD: 5.7 % (ref 0–5.6)
POTASSIUM SERPL-SCNC: 3.8 MMOL/L (ref 3.6–5.5)
PROT SERPL-MCNC: 6.9 G/DL (ref 6–8.2)
SODIUM SERPL-SCNC: 139 MMOL/L (ref 135–145)
T3FREE SERPL-MCNC: 3.63 PG/ML (ref 2.4–4.2)
TSH SERPL DL<=0.005 MIU/L-ACNC: 5 UIU/ML (ref 0.38–5.33)
VIT B12 SERPL-MCNC: 740 PG/ML (ref 211–911)

## 2019-02-12 PROCEDURE — 84443 ASSAY THYROID STIM HORMONE: CPT

## 2019-02-12 PROCEDURE — 84481 FREE ASSAY (FT-3): CPT

## 2019-02-12 PROCEDURE — 82306 VITAMIN D 25 HYDROXY: CPT

## 2019-02-12 PROCEDURE — 76536 US EXAM OF HEAD AND NECK: CPT

## 2019-02-12 PROCEDURE — 36415 COLL VENOUS BLD VENIPUNCTURE: CPT

## 2019-02-12 PROCEDURE — 80053 COMPREHEN METABOLIC PANEL: CPT

## 2019-02-12 PROCEDURE — 83036 HEMOGLOBIN GLYCOSYLATED A1C: CPT

## 2019-02-12 PROCEDURE — 82607 VITAMIN B-12: CPT

## 2019-03-25 DIAGNOSIS — E03.9 HYPOTHYROIDISM (ACQUIRED): ICD-10-CM

## 2019-03-25 NOTE — TELEPHONE ENCOUNTER
I have not seen patient since 8/2017 and thyroid has been managed by endocrinology. Will forward medication request. Stacy VELASQUEZ

## 2019-03-26 RX ORDER — LEVOTHYROXINE SODIUM 0.05 MG/1
50 TABLET ORAL
Qty: 30 TAB | Refills: 4 | Status: SHIPPED | OUTPATIENT
Start: 2019-03-26 | End: 2019-05-06

## 2019-05-06 ENCOUNTER — OFFICE VISIT (OUTPATIENT)
Dept: ENDOCRINOLOGY | Facility: MEDICAL CENTER | Age: 49
End: 2019-05-06

## 2019-05-06 ENCOUNTER — HOSPITAL ENCOUNTER (OUTPATIENT)
Dept: LAB | Facility: MEDICAL CENTER | Age: 49
End: 2019-05-06
Attending: PHYSICIAN ASSISTANT

## 2019-05-06 VITALS
HEART RATE: 77 BPM | HEIGHT: 63 IN | DIASTOLIC BLOOD PRESSURE: 80 MMHG | WEIGHT: 180 LBS | BODY MASS INDEX: 31.89 KG/M2 | SYSTOLIC BLOOD PRESSURE: 110 MMHG | OXYGEN SATURATION: 96 %

## 2019-05-06 DIAGNOSIS — E55.9 VITAMIN D DEFICIENCY: ICD-10-CM

## 2019-05-06 DIAGNOSIS — E03.9 HYPOTHYROIDISM (ACQUIRED): ICD-10-CM

## 2019-05-06 DIAGNOSIS — E66.9 OBESITY (BMI 30-39.9): ICD-10-CM

## 2019-05-06 DIAGNOSIS — R73.01 IMPAIRED FASTING GLUCOSE: ICD-10-CM

## 2019-05-06 DIAGNOSIS — E04.1 THYROID NODULE: ICD-10-CM

## 2019-05-06 LAB
25(OH)D3 SERPL-MCNC: 25 NG/ML (ref 30–100)
T3 SERPL-MCNC: 83 NG/DL (ref 60–181)
T3FREE SERPL-MCNC: 3.28 PG/ML (ref 2.4–4.2)
T4 FREE SERPL-MCNC: 0.86 NG/DL (ref 0.53–1.43)
TSH SERPL DL<=0.005 MIU/L-ACNC: 4.11 UIU/ML (ref 0.38–5.33)

## 2019-05-06 PROCEDURE — 36415 COLL VENOUS BLD VENIPUNCTURE: CPT

## 2019-05-06 PROCEDURE — 84481 FREE ASSAY (FT-3): CPT

## 2019-05-06 PROCEDURE — 84480 ASSAY TRIIODOTHYRONINE (T3): CPT

## 2019-05-06 PROCEDURE — 99214 OFFICE O/P EST MOD 30 MIN: CPT | Performed by: PHYSICIAN ASSISTANT

## 2019-05-06 PROCEDURE — 84443 ASSAY THYROID STIM HORMONE: CPT

## 2019-05-06 PROCEDURE — 84439 ASSAY OF FREE THYROXINE: CPT

## 2019-05-06 PROCEDURE — 82306 VITAMIN D 25 HYDROXY: CPT

## 2019-05-06 NOTE — PROGRESS NOTES
Endocrinology Clinic Progress Note  PCP: AILYN Bradshaw    HPI:  Barb Carlisle is a 49 y.o. old patient who comes in today for review of endocrine problems.  She is accompanied by grandson.     1. Thyroid nodule  Patient with history of thyroid. Patient had an US done and here for review.     2. Hypothyroidism (acquired)  Synthroid 50 mcg daily   Empty stomach first thing in the morning    Notes increased in fatigue from prior but also fighting cold over the last 3 weeks.   Patient feels a sensation of difficulty swallowing and increase sputum.   Intermittent palpations only at night 20 minutes occurring over the last 2 months increased with stress. No association with activity or during the day     3. Obesity (BMI 30-39.9)  5/19- 180     4. Vitamin D deficiency  Vitamin d 5000 IU daily   Consistent with medication     5. Impaired fasting glucose  Patient does manage diet and exercise       Endocrine history to date:   1. Multiple thyroid nodules  Denies any associated difficulty swallowing or breathing.     2/12/2019-thyroid ultrasound (reviewed 5/6/2019)  The left lobe of the thyroid gland measures 1.98 cm x 4.97 cm x 1.69 cm. There is a 1.5 cm echogenic nodules in the interpolar region, previously 1.2 cm. There is a 0.7 cm echogenic nodules in the lower pole, previously 0.6 cm.      7/28/2018- Thyroid US  Solid nodule mid lateral left lobe thyroid gland measures 1.14 x 1.14 x 1.24 cm. Prior exam was 1.0 x 1.1 x 1.1 cm. Nodule in lower pole left lobe measures 0.6 x 0.6 x 0.6 cm.      7/6/2017 Thyroid US  There is a nodule in the mid left thyroid lobe which measures 1.0 x 1.1 x 1.1 cm. It has not significantly changed. There is a nodule in the lower pole of the left thyroid lobe which measures 0.7 x 0.6 x 0.6 cm. Is slightly smaller than it was on previous exam.     12/22/2016-FNA left thyroid:   follicular epithelial cells consistent with follicular lesion no papillary carcinoma was seen      2.  Acquired hypothyroidism  5/5/2018 TSH 3.920 free T4 0.75 free T3 3.26- 50 mcg daily   8/29/2017 TSH 5.810 free T4 0.76    3. At risk for diabetes mellitus  5/5/2018- A1c is 5.7%.,  Total cholesterol 162, triglycerides 152, HDL 49, LDL 87      4. Vitamin D deficiency -patient is currently on vitamin D replacement  5/5/2018 vitamin D/1, 2543.9     5.  Obesity   2/19-weight 80.7 kg (178 pounds)      ROS:  Constitutional: No weight loss or gain,  fever  HEENT: No difficulty with swallowing, change in voice, or swelling in throat area   Cardiac: No chest pain, palpitations, or racing heart  Resp: No shortness of breath  GI: No abdominal pain, nausea, vomiting, or diarrhea   Neuro: No numbness or tinging in feet  Endo: No heat or cold intolerance, no polyuria or polydipsia  All other detailed ROS is otherwise negative       Past Medical History:  Patient Active Problem List    Diagnosis Date Noted   • Fatigue 02/06/2019   • Vitamin D deficiency 02/06/2019   • Impaired fasting glucose 02/06/2019   • Allergic conjunctivitis of both eyes 08/14/2017   • Hypothyroidism (acquired) 08/14/2017   • Obesity (BMI 30-39.9) 08/14/2017   • Carpal tunnel syndrome 10/04/2016   • Thyroid nodule 09/21/2016   • Healthy adult 07/02/2016       Family Medical History:   Family History   Problem Relation Age of Onset   • Diabetes Mother    • Diabetes Father    • Diabetes Brother    • Stroke Brother    • Cancer Maternal Aunt         breast   • Cancer Maternal Grandmother         throat   • Cancer Maternal Grandfather         prostate       Social History:   Social History     Social History   • Marital status:      Spouse name: N/A   • Number of children: N/A   • Years of education: N/A     Occupational History   • Not on file.     Social History Main Topics   • Smoking status: Never Smoker   • Smokeless tobacco: Never Used   • Alcohol use No   • Drug use: No   • Sexual activity: Not on file     Other Topics Concern   • Not on file  "    Social History Narrative   • No narrative on file         Medications:    Current Outpatient Prescriptions:   •  levothyroxine (SYNTHROID) 50 MCG Tab, TAKE 1 TAB BY MOUTH EVERY MORNING ON AN EMPTY STOMACH., Disp: 30 Tab, Rfl: 5  •  Cholecalciferol (VITAMIN D3 PO), Take 4,000 Units by mouth., Disp: , Rfl:   •  levothyroxine (SYNTHROID) 50 MCG Tab, TAKE 1 TAB BY MOUTH EVERY MORNING ON AN EMPTY STOMACH., Disp: 30 Tab, Rfl: 4  •  azelastine (OPTIVAR) 0.05 % ophthalmic solution, Place 1 Drop in both eyes 2 times a day. (Patient not taking: Reported on 5/6/2019), Disp: 6 mL, Rfl: 2    Labs: Reviewed as above     Physical Examination:  Vital signs: /80 (BP Location: Right arm, Patient Position: Sitting, BP Cuff Size: Adult)   Pulse 77   Ht 1.6 m (5' 3\")   Wt 81.6 kg (180 lb)   SpO2 96%   BMI 31.89 kg/m²  Body mass index is 31.89 kg/m².  General: No apparent distress, cooperative  Eyes: No scleral icterus, no discharge, normal eyelids  Neck: No abnormal masses on inspection, normal thyroid exam  Resp: Normal effort, clear to auscultation bilaterally  CVS: Regular rate and rhythm, S1 S2 normal, no murmur  Extremities: No lower extremity edema  Abdomen: abdominal obesity present  Musculoskeletal: Normal digits and nails  Skin: No rash on visible skin  Psych: Alert and oriented, normal mood and affect, intact memory and able to make informed decisions.    Assessment and Plan:  1. Thyroid nodule  Reviewed thyroid ultrasound understands slight growth but will recheck  8/2019     2. Hypothyroidism (acquired)  Patient did not have labs. Will get labs done and adjust dose to target TSH 1.   Synthoid 50 mcg daily     - TSH; Standing  - TRIIDOTHYRONINE; Standing  - T3 FREE; Standing  - FREE THYROXINE; Standing      3. Obesity (BMI 30-39.9)  Continue diet and exercise     4. Vitamin D deficiency  Chronic stable condition managed with current vitamin replacement   Continue current regimen     - VITAMIN D,25 HYDROXY; " Future    5. Impaired fasting glucose  Lifestyle changes encouraged again     Return in about 3 months (around 8/6/2019).     Thank you for allowing me to participate in the care of this patient.  If you have any questions or concerns please do not hesitate to contact me.    Dorys Sears P.A.-C.    CC:   AILYN Bradshaw    This note was created using voice recognition software (Dragon). The accuracy of the dictation is limited by the abilities of the software. I have reviewed the note prior to signing, however some errors in grammar and context are still possible. If you have any questions related to this note please do not hesitate to contact our office.

## 2019-05-18 RX ORDER — LEVOTHYROXINE SODIUM 0.07 MG/1
75 TABLET ORAL
Qty: 30 TAB | Refills: 3 | Status: SHIPPED | OUTPATIENT
Start: 2019-05-18 | End: 2019-09-19 | Stop reason: SDUPTHER

## 2019-05-18 RX ORDER — ERGOCALCIFEROL 1.25 MG/1
50000 CAPSULE ORAL
Qty: 4 CAP | Refills: 6 | Status: SHIPPED | OUTPATIENT
Start: 2019-05-18

## 2019-05-21 ENCOUNTER — TELEPHONE (OUTPATIENT)
Dept: ENDOCRINOLOGY | Facility: MEDICAL CENTER | Age: 49
End: 2019-05-21

## 2019-05-21 NOTE — TELEPHONE ENCOUNTER
Phone Number Called: [phone] (home)       Message: LVM letting patient know her lab results and medication changes    Left Message for patient to call back: No

## 2019-05-21 NOTE — TELEPHONE ENCOUNTER
----- Message from Dorys Sears P.A.-C. sent at 5/18/2019  9:41 AM PDT -----  Please call patient-   Reviewed labs     Vitamin D is still low   25 - I am going to call in 24132 IU weekly x 2 months then have decrease to 5000 IU daily     Thyroid is still undertreated -   Would like to increase dose 75 mcg daily     I called the rx today -   jonathan

## 2019-07-19 ENCOUNTER — HOSPITAL ENCOUNTER (OUTPATIENT)
Dept: LAB | Facility: MEDICAL CENTER | Age: 49
End: 2019-07-19
Attending: NURSE PRACTITIONER

## 2019-07-19 LAB
25(OH)D3 SERPL-MCNC: 69 NG/ML (ref 30–100)
EST. AVERAGE GLUCOSE BLD GHB EST-MCNC: 120 MG/DL
HBA1C MFR BLD: 5.8 % (ref 0–5.6)
T3FREE SERPL-MCNC: 3.58 PG/ML (ref 2.4–4.2)
T4 FREE SERPL-MCNC: 1.04 NG/DL (ref 0.53–1.43)
TSH SERPL DL<=0.005 MIU/L-ACNC: 2.35 UIU/ML (ref 0.38–5.33)

## 2019-07-19 PROCEDURE — 82306 VITAMIN D 25 HYDROXY: CPT

## 2019-07-19 PROCEDURE — 84443 ASSAY THYROID STIM HORMONE: CPT

## 2019-07-19 PROCEDURE — 84439 ASSAY OF FREE THYROXINE: CPT

## 2019-07-19 PROCEDURE — 83036 HEMOGLOBIN GLYCOSYLATED A1C: CPT

## 2019-07-19 PROCEDURE — 84481 FREE ASSAY (FT-3): CPT

## 2019-07-19 PROCEDURE — 86038 ANTINUCLEAR ANTIBODIES: CPT

## 2019-07-19 PROCEDURE — 36415 COLL VENOUS BLD VENIPUNCTURE: CPT

## 2019-07-22 LAB — NUCLEAR IGG SER QL IA: NORMAL

## 2019-09-19 RX ORDER — LEVOTHYROXINE SODIUM 0.07 MG/1
75 TABLET ORAL
Qty: 30 TAB | Refills: 3 | Status: SHIPPED | OUTPATIENT
Start: 2019-09-19 | End: 2019-12-19

## 2019-09-19 NOTE — TELEPHONE ENCOUNTER
Was the patient seen in the last year in this department? Yes    Does patient have an active prescription for medications requested? Yes    Received Request Via: Patient     levothyroxine (SYNTHROID) 75 MCG Tab 30 Tab 3/3 5/18/2019    Sig - Route: Take 1 Tab by mouth Every morning on an empty stomach

## 2019-09-19 NOTE — TELEPHONE ENCOUNTER
Patient needs a refill on synthroid sent to [facility] pharmacy in [place]

## 2019-12-17 NOTE — TELEPHONE ENCOUNTER
Was the patient seen in the last year in this department? Yes    Does patient have an active prescription for medications requested? Yes    Received Request Via: Pharmacy     levothyroxine (SYNTHROID) 75 MCG Tab        Sig: Take 1 Tab by mouth Every morning on an empty stomach.

## 2019-12-19 RX ORDER — LEVOTHYROXINE SODIUM 0.07 MG/1
75 TABLET ORAL
Qty: 90 TAB | Refills: 1 | Status: SHIPPED | OUTPATIENT
Start: 2019-12-19

## 2020-07-20 ENCOUNTER — HOSPITAL ENCOUNTER (OUTPATIENT)
Dept: LAB | Facility: MEDICAL CENTER | Age: 50
End: 2020-07-20
Attending: FAMILY MEDICINE

## 2020-07-20 LAB
ALBUMIN SERPL BCP-MCNC: 4.4 G/DL (ref 3.2–4.9)
ALBUMIN/GLOB SERPL: 1.7 G/DL
ALP SERPL-CCNC: 53 U/L (ref 30–99)
ALT SERPL-CCNC: 21 U/L (ref 2–50)
ANION GAP SERPL CALC-SCNC: 11 MMOL/L (ref 7–16)
AST SERPL-CCNC: 18 U/L (ref 12–45)
BILIRUB SERPL-MCNC: 0.3 MG/DL (ref 0.1–1.5)
BUN SERPL-MCNC: 17 MG/DL (ref 8–22)
CALCIUM SERPL-MCNC: 9.5 MG/DL (ref 8.5–10.5)
CHLORIDE SERPL-SCNC: 104 MMOL/L (ref 96–112)
CHOLEST SERPL-MCNC: 153 MG/DL (ref 100–199)
CO2 SERPL-SCNC: 22 MMOL/L (ref 20–33)
CREAT SERPL-MCNC: 0.76 MG/DL (ref 0.5–1.4)
EST. AVERAGE GLUCOSE BLD GHB EST-MCNC: 120 MG/DL
FASTING STATUS PATIENT QL REPORTED: NORMAL
GLOBULIN SER CALC-MCNC: 2.6 G/DL (ref 1.9–3.5)
GLUCOSE SERPL-MCNC: 95 MG/DL (ref 65–99)
HBA1C MFR BLD: 5.8 % (ref 0–5.6)
HDLC SERPL-MCNC: 43 MG/DL
LDLC SERPL CALC-MCNC: 87 MG/DL
POTASSIUM SERPL-SCNC: 3.9 MMOL/L (ref 3.6–5.5)
PROT SERPL-MCNC: 7 G/DL (ref 6–8.2)
SODIUM SERPL-SCNC: 137 MMOL/L (ref 135–145)
T3 SERPL-MCNC: 85.2 NG/DL (ref 60–181)
T4 SERPL-MCNC: 7.3 UG/DL (ref 4–12)
TRIGL SERPL-MCNC: 116 MG/DL (ref 0–149)
TSH SERPL DL<=0.005 MIU/L-ACNC: 5.09 UIU/ML (ref 0.38–5.33)

## 2020-07-20 PROCEDURE — 84480 ASSAY TRIIODOTHYRONINE (T3): CPT

## 2020-07-20 PROCEDURE — 80061 LIPID PANEL: CPT

## 2020-07-20 PROCEDURE — 84443 ASSAY THYROID STIM HORMONE: CPT

## 2020-07-20 PROCEDURE — 84436 ASSAY OF TOTAL THYROXINE: CPT

## 2020-07-20 PROCEDURE — 80053 COMPREHEN METABOLIC PANEL: CPT

## 2020-07-20 PROCEDURE — 36415 COLL VENOUS BLD VENIPUNCTURE: CPT

## 2020-07-20 PROCEDURE — 83036 HEMOGLOBIN GLYCOSYLATED A1C: CPT

## 2024-04-22 ENCOUNTER — HOSPITAL ENCOUNTER (OUTPATIENT)
Dept: LAB | Facility: MEDICAL CENTER | Age: 54
End: 2024-04-22
Attending: NURSE PRACTITIONER
Payer: COMMERCIAL

## 2024-04-22 LAB
25(OH)D3 SERPL-MCNC: 36 NG/ML (ref 30–100)
ALBUMIN SERPL BCP-MCNC: 4.5 G/DL (ref 3.2–4.9)
ALBUMIN/GLOB SERPL: 1.5 G/DL
ALP SERPL-CCNC: 58 U/L (ref 30–99)
ALT SERPL-CCNC: 26 U/L (ref 2–50)
ANION GAP SERPL CALC-SCNC: 12 MMOL/L (ref 7–16)
AST SERPL-CCNC: 22 U/L (ref 12–45)
BASOPHILS # BLD AUTO: 0.6 % (ref 0–1.8)
BASOPHILS # BLD: 0.04 K/UL (ref 0–0.12)
BILIRUB SERPL-MCNC: 0.3 MG/DL (ref 0.1–1.5)
BUN SERPL-MCNC: 18 MG/DL (ref 8–22)
CALCIUM ALBUM COR SERPL-MCNC: 8.9 MG/DL (ref 8.5–10.5)
CALCIUM SERPL-MCNC: 9.3 MG/DL (ref 8.5–10.5)
CHLORIDE SERPL-SCNC: 106 MMOL/L (ref 96–112)
CHOLEST SERPL-MCNC: 182 MG/DL (ref 100–199)
CO2 SERPL-SCNC: 22 MMOL/L (ref 20–33)
CREAT SERPL-MCNC: 0.62 MG/DL (ref 0.5–1.4)
EOSINOPHIL # BLD AUTO: 0.18 K/UL (ref 0–0.51)
EOSINOPHIL NFR BLD: 2.6 % (ref 0–6.9)
ERYTHROCYTE [DISTWIDTH] IN BLOOD BY AUTOMATED COUNT: 42.4 FL (ref 35.9–50)
EST. AVERAGE GLUCOSE BLD GHB EST-MCNC: 123 MG/DL
FASTING STATUS PATIENT QL REPORTED: NORMAL
GFR SERPLBLD CREATININE-BSD FMLA CKD-EPI: 107 ML/MIN/1.73 M 2
GLOBULIN SER CALC-MCNC: 3.1 G/DL (ref 1.9–3.5)
GLUCOSE SERPL-MCNC: 103 MG/DL (ref 65–99)
HBA1C MFR BLD: 5.9 % (ref 4–5.6)
HCT VFR BLD AUTO: 41.2 % (ref 37–47)
HDLC SERPL-MCNC: 49 MG/DL
HGB BLD-MCNC: 13.7 G/DL (ref 12–16)
IMM GRANULOCYTES # BLD AUTO: 0.04 K/UL (ref 0–0.11)
IMM GRANULOCYTES NFR BLD AUTO: 0.6 % (ref 0–0.9)
LDLC SERPL CALC-MCNC: 113 MG/DL
LYMPHOCYTES # BLD AUTO: 1.97 K/UL (ref 1–4.8)
LYMPHOCYTES NFR BLD: 28.1 % (ref 22–41)
MCH RBC QN AUTO: 29.8 PG (ref 27–33)
MCHC RBC AUTO-ENTMCNC: 33.3 G/DL (ref 32.2–35.5)
MCV RBC AUTO: 89.6 FL (ref 81.4–97.8)
MONOCYTES # BLD AUTO: 0.47 K/UL (ref 0–0.85)
MONOCYTES NFR BLD AUTO: 6.7 % (ref 0–13.4)
NEUTROPHILS # BLD AUTO: 4.32 K/UL (ref 1.82–7.42)
NEUTROPHILS NFR BLD: 61.4 % (ref 44–72)
NRBC # BLD AUTO: 0 K/UL
NRBC BLD-RTO: 0 /100 WBC (ref 0–0.2)
PLATELET # BLD AUTO: 274 K/UL (ref 164–446)
PMV BLD AUTO: 11.3 FL (ref 9–12.9)
POTASSIUM SERPL-SCNC: 3.8 MMOL/L (ref 3.6–5.5)
PROT SERPL-MCNC: 7.6 G/DL (ref 6–8.2)
RBC # BLD AUTO: 4.6 M/UL (ref 4.2–5.4)
SODIUM SERPL-SCNC: 140 MMOL/L (ref 135–145)
T3FREE SERPL-MCNC: 3.02 PG/ML (ref 2–4.4)
T4 FREE SERPL-MCNC: 1.39 NG/DL (ref 0.93–1.7)
TRIGL SERPL-MCNC: 101 MG/DL (ref 0–149)
TSH SERPL DL<=0.005 MIU/L-ACNC: 6.35 UIU/ML (ref 0.38–5.33)
WBC # BLD AUTO: 7 K/UL (ref 4.8–10.8)

## 2024-04-22 PROCEDURE — 84443 ASSAY THYROID STIM HORMONE: CPT

## 2024-04-22 PROCEDURE — 80053 COMPREHEN METABOLIC PANEL: CPT

## 2024-04-22 PROCEDURE — 85025 COMPLETE CBC W/AUTO DIFF WBC: CPT

## 2024-04-22 PROCEDURE — 36415 COLL VENOUS BLD VENIPUNCTURE: CPT

## 2024-04-22 PROCEDURE — 82306 VITAMIN D 25 HYDROXY: CPT

## 2024-04-22 PROCEDURE — 84439 ASSAY OF FREE THYROXINE: CPT

## 2024-04-22 PROCEDURE — 83036 HEMOGLOBIN GLYCOSYLATED A1C: CPT

## 2024-04-22 PROCEDURE — 84481 FREE ASSAY (FT-3): CPT

## 2024-04-22 PROCEDURE — 80061 LIPID PANEL: CPT
